# Patient Record
Sex: FEMALE | Race: OTHER | HISPANIC OR LATINO | URBAN - METROPOLITAN AREA
[De-identification: names, ages, dates, MRNs, and addresses within clinical notes are randomized per-mention and may not be internally consistent; named-entity substitution may affect disease eponyms.]

---

## 2020-08-30 ENCOUNTER — OUTPATIENT (OUTPATIENT)
Dept: OUTPATIENT SERVICES | Facility: HOSPITAL | Age: 40
LOS: 1 days | End: 2020-08-30
Payer: COMMERCIAL

## 2020-08-30 DIAGNOSIS — O26.899 OTHER SPECIFIED PREGNANCY RELATED CONDITIONS, UNSPECIFIED TRIMESTER: ICD-10-CM

## 2020-08-30 DIAGNOSIS — Z3A.00 WEEKS OF GESTATION OF PREGNANCY NOT SPECIFIED: ICD-10-CM

## 2020-08-30 PROCEDURE — 76818 FETAL BIOPHYS PROFILE W/NST: CPT

## 2020-08-30 PROCEDURE — 76818 FETAL BIOPHYS PROFILE W/NST: CPT | Mod: 26

## 2020-08-30 PROCEDURE — 76815 OB US LIMITED FETUS(S): CPT

## 2020-08-30 PROCEDURE — 59025 FETAL NON-STRESS TEST: CPT

## 2020-08-30 PROCEDURE — 76815 OB US LIMITED FETUS(S): CPT | Mod: 26

## 2020-08-30 PROCEDURE — G0463: CPT

## 2020-09-01 ENCOUNTER — INPATIENT (INPATIENT)
Facility: HOSPITAL | Age: 40
LOS: 1 days | Discharge: HOME | End: 2020-09-03
Attending: OBSTETRICS & GYNECOLOGY | Admitting: OBSTETRICS & GYNECOLOGY
Payer: COMMERCIAL

## 2020-09-01 VITALS — HEIGHT: 63 IN | WEIGHT: 178.57 LBS

## 2020-09-01 DIAGNOSIS — Z34.80 ENCOUNTER FOR SUPERVISION OF OTHER NORMAL PREGNANCY, UNSPECIFIED TRIMESTER: ICD-10-CM

## 2020-09-01 DIAGNOSIS — Z3A.00 WEEKS OF GESTATION OF PREGNANCY NOT SPECIFIED: ICD-10-CM

## 2020-09-01 DIAGNOSIS — O26.899 OTHER SPECIFIED PREGNANCY RELATED CONDITIONS, UNSPECIFIED TRIMESTER: ICD-10-CM

## 2020-09-01 LAB
ABO RH CONFIRMATION: SIGNIFICANT CHANGE UP
APTT BLD: 26.2 SEC — LOW (ref 27.5–35.5)
BASOPHILS # BLD AUTO: 0.02 K/UL — SIGNIFICANT CHANGE UP (ref 0–0.2)
BASOPHILS # BLD AUTO: 0.02 K/UL — SIGNIFICANT CHANGE UP (ref 0–0.2)
BASOPHILS NFR BLD AUTO: 0.1 % — SIGNIFICANT CHANGE UP (ref 0–2)
BASOPHILS NFR BLD AUTO: 0.2 % — SIGNIFICANT CHANGE UP (ref 0–2)
EOSINOPHIL # BLD AUTO: 0 K/UL — SIGNIFICANT CHANGE UP (ref 0–0.5)
EOSINOPHIL # BLD AUTO: 0.02 K/UL — SIGNIFICANT CHANGE UP (ref 0–0.5)
EOSINOPHIL NFR BLD AUTO: 0 % — SIGNIFICANT CHANGE UP (ref 0–6)
EOSINOPHIL NFR BLD AUTO: 0.2 % — SIGNIFICANT CHANGE UP (ref 0–6)
FIBRINOGEN PPP-MCNC: 699 MG/DL — HIGH (ref 350–510)
HCT VFR BLD CALC: 32.6 % — LOW (ref 34.5–45)
HCT VFR BLD CALC: 36.4 % — SIGNIFICANT CHANGE UP (ref 34.5–45)
HGB BLD-MCNC: 10.7 G/DL — LOW (ref 11.5–15.5)
HGB BLD-MCNC: 12 G/DL — SIGNIFICANT CHANGE UP (ref 11.5–15.5)
IMM GRANULOCYTES NFR BLD AUTO: 0.4 % — SIGNIFICANT CHANGE UP (ref 0–1.5)
IMM GRANULOCYTES NFR BLD AUTO: 0.6 % — SIGNIFICANT CHANGE UP (ref 0–1.5)
INR BLD: 0.89 RATIO — SIGNIFICANT CHANGE UP (ref 0.88–1.16)
LYMPHOCYTES # BLD AUTO: 1.32 K/UL — SIGNIFICANT CHANGE UP (ref 1–3.3)
LYMPHOCYTES # BLD AUTO: 17.4 % — SIGNIFICANT CHANGE UP (ref 13–44)
LYMPHOCYTES # BLD AUTO: 2 K/UL — SIGNIFICANT CHANGE UP (ref 1–3.3)
LYMPHOCYTES # BLD AUTO: 8.4 % — LOW (ref 13–44)
MCHC RBC-ENTMCNC: 28.6 PG — SIGNIFICANT CHANGE UP (ref 27–34)
MCHC RBC-ENTMCNC: 28.8 PG — SIGNIFICANT CHANGE UP (ref 27–34)
MCHC RBC-ENTMCNC: 32.8 GM/DL — SIGNIFICANT CHANGE UP (ref 32–36)
MCHC RBC-ENTMCNC: 33 GM/DL — SIGNIFICANT CHANGE UP (ref 32–36)
MCV RBC AUTO: 87.2 FL — SIGNIFICANT CHANGE UP (ref 80–100)
MCV RBC AUTO: 87.3 FL — SIGNIFICANT CHANGE UP (ref 80–100)
MONOCYTES # BLD AUTO: 0.68 K/UL — SIGNIFICANT CHANGE UP (ref 0–0.9)
MONOCYTES # BLD AUTO: 0.73 K/UL — SIGNIFICANT CHANGE UP (ref 0–0.9)
MONOCYTES NFR BLD AUTO: 4.7 % — SIGNIFICANT CHANGE UP (ref 2–14)
MONOCYTES NFR BLD AUTO: 5.9 % — SIGNIFICANT CHANGE UP (ref 2–14)
NEUTROPHILS # BLD AUTO: 13.49 K/UL — HIGH (ref 1.8–7.4)
NEUTROPHILS # BLD AUTO: 8.73 K/UL — HIGH (ref 1.8–7.4)
NEUTROPHILS NFR BLD AUTO: 75.9 % — SIGNIFICANT CHANGE UP (ref 43–77)
NEUTROPHILS NFR BLD AUTO: 86.2 % — HIGH (ref 43–77)
NRBC # BLD: 0 /100 WBCS — SIGNIFICANT CHANGE UP (ref 0–0)
NRBC # BLD: 0 /100 WBCS — SIGNIFICANT CHANGE UP (ref 0–0)
PLATELET # BLD AUTO: 234 K/UL — SIGNIFICANT CHANGE UP (ref 150–400)
PLATELET # BLD AUTO: 272 K/UL — SIGNIFICANT CHANGE UP (ref 150–400)
PROTHROM AB SERPL-ACNC: 10.5 SEC — LOW (ref 10.6–13.6)
RBC # BLD: 3.74 M/UL — LOW (ref 3.8–5.2)
RBC # BLD: 4.17 M/UL — SIGNIFICANT CHANGE UP (ref 3.8–5.2)
RBC # FLD: 14.5 % — SIGNIFICANT CHANGE UP (ref 10.3–14.5)
RBC # FLD: 14.8 % — HIGH (ref 10.3–14.5)
SARS-COV-2 IGG SERPL QL IA: NEGATIVE — SIGNIFICANT CHANGE UP
SARS-COV-2 IGM SERPL IA-ACNC: 8.88 AU/ML — SIGNIFICANT CHANGE UP
SARS-COV-2 RNA SPEC QL NAA+PROBE: SIGNIFICANT CHANGE UP
T PALLIDUM AB TITR SER: NEGATIVE — SIGNIFICANT CHANGE UP
WBC # BLD: 11.5 K/UL — HIGH (ref 3.8–10.5)
WBC # BLD: 15.65 K/UL — HIGH (ref 3.8–10.5)
WBC # FLD AUTO: 11.5 K/UL — HIGH (ref 3.8–10.5)
WBC # FLD AUTO: 15.65 K/UL — HIGH (ref 3.8–10.5)

## 2020-09-01 RX ORDER — OXYCODONE HYDROCHLORIDE 5 MG/1
5 TABLET ORAL
Refills: 0 | Status: DISCONTINUED | OUTPATIENT
Start: 2020-09-01 | End: 2020-09-03

## 2020-09-01 RX ORDER — LANOLIN
1 OINTMENT (GRAM) TOPICAL EVERY 6 HOURS
Refills: 0 | Status: DISCONTINUED | OUTPATIENT
Start: 2020-09-01 | End: 2020-09-03

## 2020-09-01 RX ORDER — SIMETHICONE 80 MG/1
80 TABLET, CHEWABLE ORAL EVERY 4 HOURS
Refills: 0 | Status: DISCONTINUED | OUTPATIENT
Start: 2020-09-01 | End: 2020-09-03

## 2020-09-01 RX ORDER — AZITHROMYCIN 500 MG/1
500 TABLET, FILM COATED ORAL ONCE
Refills: 0 | Status: COMPLETED | OUTPATIENT
Start: 2020-09-01 | End: 2020-09-01

## 2020-09-01 RX ORDER — SODIUM CHLORIDE 9 MG/ML
1000 INJECTION, SOLUTION INTRAVENOUS
Refills: 0 | Status: DISCONTINUED | OUTPATIENT
Start: 2020-09-01 | End: 2020-09-01

## 2020-09-01 RX ORDER — FOLIC ACID 0.8 MG
1 TABLET ORAL DAILY
Refills: 0 | Status: DISCONTINUED | OUTPATIENT
Start: 2020-09-01 | End: 2020-09-03

## 2020-09-01 RX ORDER — TETANUS TOXOID, REDUCED DIPHTHERIA TOXOID AND ACELLULAR PERTUSSIS VACCINE, ADSORBED 5; 2.5; 8; 8; 2.5 [IU]/.5ML; [IU]/.5ML; UG/.5ML; UG/.5ML; UG/.5ML
0.5 SUSPENSION INTRAMUSCULAR ONCE
Refills: 0 | Status: DISCONTINUED | OUTPATIENT
Start: 2020-09-01 | End: 2020-09-03

## 2020-09-01 RX ORDER — IBUPROFEN 200 MG
600 TABLET ORAL EVERY 6 HOURS
Refills: 0 | Status: COMPLETED | OUTPATIENT
Start: 2020-09-01 | End: 2021-07-31

## 2020-09-01 RX ORDER — FERROUS SULFATE 325(65) MG
325 TABLET ORAL DAILY
Refills: 0 | Status: DISCONTINUED | OUTPATIENT
Start: 2020-09-01 | End: 2020-09-03

## 2020-09-01 RX ORDER — OXYTOCIN 10 UNIT/ML
333.33 VIAL (ML) INJECTION
Qty: 20 | Refills: 0 | Status: DISCONTINUED | OUTPATIENT
Start: 2020-09-01 | End: 2020-09-03

## 2020-09-01 RX ORDER — HEPARIN SODIUM 5000 [USP'U]/ML
5000 INJECTION INTRAVENOUS; SUBCUTANEOUS EVERY 12 HOURS
Refills: 0 | Status: DISCONTINUED | OUTPATIENT
Start: 2020-09-01 | End: 2020-09-03

## 2020-09-01 RX ORDER — MAGNESIUM HYDROXIDE 400 MG/1
30 TABLET, CHEWABLE ORAL
Refills: 0 | Status: DISCONTINUED | OUTPATIENT
Start: 2020-09-01 | End: 2020-09-03

## 2020-09-01 RX ORDER — ACETAMINOPHEN 500 MG
975 TABLET ORAL
Refills: 0 | Status: DISCONTINUED | OUTPATIENT
Start: 2020-09-01 | End: 2020-09-03

## 2020-09-01 RX ORDER — SODIUM CHLORIDE 9 MG/ML
1000 INJECTION, SOLUTION INTRAVENOUS
Refills: 0 | Status: DISCONTINUED | OUTPATIENT
Start: 2020-09-01 | End: 2020-09-03

## 2020-09-01 RX ORDER — DIPHENHYDRAMINE HCL 50 MG
25 CAPSULE ORAL EVERY 6 HOURS
Refills: 0 | Status: DISCONTINUED | OUTPATIENT
Start: 2020-09-01 | End: 2020-09-03

## 2020-09-01 RX ORDER — KETOROLAC TROMETHAMINE 30 MG/ML
30 SYRINGE (ML) INJECTION EVERY 6 HOURS
Refills: 0 | Status: DISCONTINUED | OUTPATIENT
Start: 2020-09-01 | End: 2020-09-02

## 2020-09-01 RX ORDER — CITRIC ACID/SODIUM CITRATE 300-500 MG
15 SOLUTION, ORAL ORAL EVERY 6 HOURS
Refills: 0 | Status: DISCONTINUED | OUTPATIENT
Start: 2020-09-01 | End: 2020-09-01

## 2020-09-01 RX ORDER — CEFAZOLIN SODIUM 1 G
2000 VIAL (EA) INJECTION ONCE
Refills: 0 | Status: COMPLETED | OUTPATIENT
Start: 2020-09-01 | End: 2020-09-01

## 2020-09-01 RX ORDER — CITRIC ACID/SODIUM CITRATE 300-500 MG
30 SOLUTION, ORAL ORAL ONCE
Refills: 0 | Status: COMPLETED | OUTPATIENT
Start: 2020-09-01 | End: 2020-09-01

## 2020-09-01 RX ADMIN — SIMETHICONE 80 MILLIGRAM(S): 80 TABLET, CHEWABLE ORAL at 21:35

## 2020-09-01 RX ADMIN — SODIUM CHLORIDE 125 MILLILITER(S): 9 INJECTION, SOLUTION INTRAVENOUS at 02:50

## 2020-09-01 RX ADMIN — Medication 975 MILLIGRAM(S): at 22:10

## 2020-09-01 RX ADMIN — HEPARIN SODIUM 5000 UNIT(S): 5000 INJECTION INTRAVENOUS; SUBCUTANEOUS at 21:35

## 2020-09-01 RX ADMIN — MAGNESIUM HYDROXIDE 30 MILLILITER(S): 400 TABLET, CHEWABLE ORAL at 18:26

## 2020-09-01 RX ADMIN — SIMETHICONE 80 MILLIGRAM(S): 80 TABLET, CHEWABLE ORAL at 18:08

## 2020-09-01 RX ADMIN — AZITHROMYCIN 255 MILLIGRAM(S): 500 TABLET, FILM COATED ORAL at 07:49

## 2020-09-01 RX ADMIN — Medication 30 MILLIGRAM(S): at 19:00

## 2020-09-01 RX ADMIN — Medication 100 MILLIGRAM(S): at 07:47

## 2020-09-01 RX ADMIN — Medication 1000 MILLIUNIT(S)/MIN: at 10:54

## 2020-09-01 RX ADMIN — Medication 975 MILLIGRAM(S): at 21:36

## 2020-09-01 RX ADMIN — Medication 30 MILLIGRAM(S): at 23:52

## 2020-09-01 RX ADMIN — Medication 30 MILLIGRAM(S): at 18:26

## 2020-09-01 NOTE — PATIENT PROFILE OB - FALL HARM RISK CONCLUSION
Patient placed on cardiac monitor.       Mariam Rene RN  08/04/20 3896 [employer]ventions

## 2020-09-01 NOTE — CHART NOTE - NSCHARTNOTEFT_GEN_A_CORE
Pt seen at bedside offers no complaints. Denies n/v, CP; SOB; palpitations; or dizziness    T(C): 36.7 (09-01-20 @ 10:48), Max: 36.7 (09-01-20 @ 00:53)  HR: 83 (09-01-20 @ 13:00) (78 - 95)  BP: 109/59 (09-01-20 @ 13:00) (105/64 - 131/80)  RR: 18 (09-01-20 @ 13:00) (11 - 18)  SpO2: 99% (09-01-20 @ 13:00) (97% - 100%)    abd: soft/nt, fundus firm, dressing in place C/D/I  pelvic: minimal lochia  ext: venodynes in place; no calf pain    A/P: POD #0 s/p primary c/s at 40.3 wks   cont close monitor   cont post op care  d/w Dr. Currie

## 2020-09-02 LAB
BASOPHILS # BLD AUTO: 0.03 K/UL — SIGNIFICANT CHANGE UP (ref 0–0.2)
BASOPHILS NFR BLD AUTO: 0.2 % — SIGNIFICANT CHANGE UP (ref 0–2)
EOSINOPHIL # BLD AUTO: 0.03 K/UL — SIGNIFICANT CHANGE UP (ref 0–0.5)
EOSINOPHIL NFR BLD AUTO: 0.2 % — SIGNIFICANT CHANGE UP (ref 0–6)
HCT VFR BLD CALC: 31 % — LOW (ref 34.5–45)
HGB BLD-MCNC: 10 G/DL — LOW (ref 11.5–15.5)
IMM GRANULOCYTES NFR BLD AUTO: 0.9 % — SIGNIFICANT CHANGE UP (ref 0–1.5)
LYMPHOCYTES # BLD AUTO: 19.5 % — SIGNIFICANT CHANGE UP (ref 13–44)
LYMPHOCYTES # BLD AUTO: 2.39 K/UL — SIGNIFICANT CHANGE UP (ref 1–3.3)
MCHC RBC-ENTMCNC: 28.6 PG — SIGNIFICANT CHANGE UP (ref 27–34)
MCHC RBC-ENTMCNC: 32.3 GM/DL — SIGNIFICANT CHANGE UP (ref 32–36)
MCV RBC AUTO: 88.6 FL — SIGNIFICANT CHANGE UP (ref 80–100)
MONOCYTES # BLD AUTO: 0.91 K/UL — HIGH (ref 0–0.9)
MONOCYTES NFR BLD AUTO: 7.4 % — SIGNIFICANT CHANGE UP (ref 2–14)
NEUTROPHILS # BLD AUTO: 8.8 K/UL — HIGH (ref 1.8–7.4)
NEUTROPHILS NFR BLD AUTO: 71.8 % — SIGNIFICANT CHANGE UP (ref 43–77)
NRBC # BLD: 0 /100 WBCS — SIGNIFICANT CHANGE UP (ref 0–0)
PLATELET # BLD AUTO: 222 K/UL — SIGNIFICANT CHANGE UP (ref 150–400)
RBC # BLD: 3.5 M/UL — LOW (ref 3.8–5.2)
RBC # FLD: 14.8 % — HIGH (ref 10.3–14.5)
WBC # BLD: 12.27 K/UL — HIGH (ref 3.8–10.5)
WBC # FLD AUTO: 12.27 K/UL — HIGH (ref 3.8–10.5)

## 2020-09-02 RX ORDER — IBUPROFEN 200 MG
600 TABLET ORAL EVERY 6 HOURS
Refills: 0 | Status: DISCONTINUED | OUTPATIENT
Start: 2020-09-02 | End: 2020-09-03

## 2020-09-02 RX ADMIN — Medication 1 MILLIGRAM(S): at 12:22

## 2020-09-02 RX ADMIN — Medication 600 MILLIGRAM(S): at 17:45

## 2020-09-02 RX ADMIN — SIMETHICONE 80 MILLIGRAM(S): 80 TABLET, CHEWABLE ORAL at 05:56

## 2020-09-02 RX ADMIN — SIMETHICONE 80 MILLIGRAM(S): 80 TABLET, CHEWABLE ORAL at 21:03

## 2020-09-02 RX ADMIN — Medication 975 MILLIGRAM(S): at 03:26

## 2020-09-02 RX ADMIN — Medication 975 MILLIGRAM(S): at 04:00

## 2020-09-02 RX ADMIN — Medication 30 MILLIGRAM(S): at 12:00

## 2020-09-02 RX ADMIN — SIMETHICONE 80 MILLIGRAM(S): 80 TABLET, CHEWABLE ORAL at 12:22

## 2020-09-02 RX ADMIN — Medication 1 TABLET(S): at 12:22

## 2020-09-02 RX ADMIN — Medication 975 MILLIGRAM(S): at 21:02

## 2020-09-02 RX ADMIN — Medication 600 MILLIGRAM(S): at 17:15

## 2020-09-02 RX ADMIN — Medication 975 MILLIGRAM(S): at 21:38

## 2020-09-02 RX ADMIN — SIMETHICONE 80 MILLIGRAM(S): 80 TABLET, CHEWABLE ORAL at 17:15

## 2020-09-02 RX ADMIN — MAGNESIUM HYDROXIDE 30 MILLILITER(S): 400 TABLET, CHEWABLE ORAL at 05:56

## 2020-09-02 RX ADMIN — Medication 30 MILLIGRAM(S): at 12:30

## 2020-09-02 RX ADMIN — SIMETHICONE 80 MILLIGRAM(S): 80 TABLET, CHEWABLE ORAL at 03:26

## 2020-09-02 RX ADMIN — Medication 325 MILLIGRAM(S): at 12:22

## 2020-09-02 RX ADMIN — MAGNESIUM HYDROXIDE 30 MILLILITER(S): 400 TABLET, CHEWABLE ORAL at 17:16

## 2020-09-02 RX ADMIN — Medication 30 MILLIGRAM(S): at 06:29

## 2020-09-02 RX ADMIN — Medication 30 MILLIGRAM(S): at 00:20

## 2020-09-02 RX ADMIN — Medication 30 MILLIGRAM(S): at 05:56

## 2020-09-02 RX ADMIN — HEPARIN SODIUM 5000 UNIT(S): 5000 INJECTION INTRAVENOUS; SUBCUTANEOUS at 21:35

## 2020-09-02 RX ADMIN — HEPARIN SODIUM 5000 UNIT(S): 5000 INJECTION INTRAVENOUS; SUBCUTANEOUS at 10:00

## 2020-09-02 NOTE — PROGRESS NOTE ADULT - ASSESSMENT
A/P:  39y POD # 1 s/p primary  @40w3d, for cat 2 traing remote from delivery, currently in stable condition  - Heparin for dvt prophylaxis  - OOB  - incentive spirometry  - dressing removed  - advance diet with flatus  - continue pain mgmt  - continue post op care  -cbc in am    DR Rosey hagen

## 2020-09-03 VITALS
TEMPERATURE: 98 F | SYSTOLIC BLOOD PRESSURE: 110 MMHG | OXYGEN SATURATION: 98 % | DIASTOLIC BLOOD PRESSURE: 62 MMHG | HEART RATE: 67 BPM | RESPIRATION RATE: 18 BRPM

## 2020-09-03 LAB
BASOPHILS # BLD AUTO: 0.02 K/UL — SIGNIFICANT CHANGE UP (ref 0–0.2)
BASOPHILS NFR BLD AUTO: 0.2 % — SIGNIFICANT CHANGE UP (ref 0–2)
EOSINOPHIL # BLD AUTO: 0.03 K/UL — SIGNIFICANT CHANGE UP (ref 0–0.5)
EOSINOPHIL NFR BLD AUTO: 0.3 % — SIGNIFICANT CHANGE UP (ref 0–6)
HCT VFR BLD CALC: 30.9 % — LOW (ref 34.5–45)
HGB BLD-MCNC: 9.8 G/DL — LOW (ref 11.5–15.5)
IMM GRANULOCYTES NFR BLD AUTO: 0.8 % — SIGNIFICANT CHANGE UP (ref 0–1.5)
LYMPHOCYTES # BLD AUTO: 2.27 K/UL — SIGNIFICANT CHANGE UP (ref 1–3.3)
LYMPHOCYTES # BLD AUTO: 25.6 % — SIGNIFICANT CHANGE UP (ref 13–44)
MCHC RBC-ENTMCNC: 28.6 PG — SIGNIFICANT CHANGE UP (ref 27–34)
MCHC RBC-ENTMCNC: 31.7 GM/DL — LOW (ref 32–36)
MCV RBC AUTO: 90.1 FL — SIGNIFICANT CHANGE UP (ref 80–100)
MONOCYTES # BLD AUTO: 0.59 K/UL — SIGNIFICANT CHANGE UP (ref 0–0.9)
MONOCYTES NFR BLD AUTO: 6.6 % — SIGNIFICANT CHANGE UP (ref 2–14)
NEUTROPHILS # BLD AUTO: 5.9 K/UL — SIGNIFICANT CHANGE UP (ref 1.8–7.4)
NEUTROPHILS NFR BLD AUTO: 66.5 % — SIGNIFICANT CHANGE UP (ref 43–77)
NRBC # BLD: 0 /100 WBCS — SIGNIFICANT CHANGE UP (ref 0–0)
PLATELET # BLD AUTO: 243 K/UL — SIGNIFICANT CHANGE UP (ref 150–400)
RBC # BLD: 3.43 M/UL — LOW (ref 3.8–5.2)
RBC # FLD: 15.2 % — HIGH (ref 10.3–14.5)
WBC # BLD: 8.88 K/UL — SIGNIFICANT CHANGE UP (ref 3.8–10.5)
WBC # FLD AUTO: 8.88 K/UL — SIGNIFICANT CHANGE UP (ref 3.8–10.5)

## 2020-09-03 PROCEDURE — 36415 COLL VENOUS BLD VENIPUNCTURE: CPT

## 2020-09-03 PROCEDURE — 86850 RBC ANTIBODY SCREEN: CPT

## 2020-09-03 PROCEDURE — 85027 COMPLETE CBC AUTOMATED: CPT

## 2020-09-03 PROCEDURE — 85384 FIBRINOGEN ACTIVITY: CPT

## 2020-09-03 PROCEDURE — 86780 TREPONEMA PALLIDUM: CPT

## 2020-09-03 PROCEDURE — G0463: CPT

## 2020-09-03 PROCEDURE — 59025 FETAL NON-STRESS TEST: CPT

## 2020-09-03 PROCEDURE — 85730 THROMBOPLASTIN TIME PARTIAL: CPT

## 2020-09-03 PROCEDURE — 86923 COMPATIBILITY TEST ELECTRIC: CPT

## 2020-09-03 PROCEDURE — 86901 BLOOD TYPING SEROLOGIC RH(D): CPT

## 2020-09-03 PROCEDURE — 86900 BLOOD TYPING SEROLOGIC ABO: CPT

## 2020-09-03 PROCEDURE — 59050 FETAL MONITOR W/REPORT: CPT

## 2020-09-03 PROCEDURE — 86769 SARS-COV-2 COVID-19 ANTIBODY: CPT

## 2020-09-03 PROCEDURE — 85610 PROTHROMBIN TIME: CPT

## 2020-09-03 PROCEDURE — 87635 SARS-COV-2 COVID-19 AMP PRB: CPT

## 2020-09-03 RX ORDER — INFLUENZA VIRUS VACCINE 15; 15; 15; 15 UG/.5ML; UG/.5ML; UG/.5ML; UG/.5ML
0.5 SUSPENSION INTRAMUSCULAR ONCE
Refills: 0 | Status: DISCONTINUED | OUTPATIENT
Start: 2020-09-03 | End: 2020-09-03

## 2020-09-03 RX ORDER — FERROUS SULFATE 325(65) MG
1 TABLET ORAL
Qty: 30 | Refills: 0
Start: 2020-09-03 | End: 2020-10-02

## 2020-09-03 RX ORDER — DOCUSATE SODIUM 100 MG
1 CAPSULE ORAL
Qty: 60 | Refills: 0
Start: 2020-09-03 | End: 2020-10-02

## 2020-09-03 RX ORDER — IBUPROFEN 200 MG
1 TABLET ORAL
Qty: 12 | Refills: 0
Start: 2020-09-03 | End: 2020-09-05

## 2020-09-03 RX ADMIN — Medication 975 MILLIGRAM(S): at 02:50

## 2020-09-03 RX ADMIN — SIMETHICONE 80 MILLIGRAM(S): 80 TABLET, CHEWABLE ORAL at 08:15

## 2020-09-03 RX ADMIN — SIMETHICONE 80 MILLIGRAM(S): 80 TABLET, CHEWABLE ORAL at 06:09

## 2020-09-03 RX ADMIN — Medication 975 MILLIGRAM(S): at 09:00

## 2020-09-03 RX ADMIN — Medication 975 MILLIGRAM(S): at 08:14

## 2020-09-03 RX ADMIN — Medication 600 MILLIGRAM(S): at 06:45

## 2020-09-03 RX ADMIN — Medication 600 MILLIGRAM(S): at 06:10

## 2020-09-03 RX ADMIN — Medication 975 MILLIGRAM(S): at 03:30

## 2020-09-03 RX ADMIN — Medication 600 MILLIGRAM(S): at 00:41

## 2020-09-03 RX ADMIN — Medication 600 MILLIGRAM(S): at 00:08

## 2020-09-03 NOTE — PROGRESS NOTE ADULT - PROBLEM SELECTOR PLAN 1
A/P: POD #2 s/p c/s     -d/c home   -instructions verbalized  -follow up in 1-2weeks in [place]  -d/w dr.cha bunn

## 2020-09-03 NOTE — DISCHARGE NOTE OB - MATERIALS PROVIDED
Breastfeeding Mother’s Support Group Information/Guide to Postpartum Care/Back To Sleep Handout/[facility]  Immunization Record/Breastfeeding Log/[facility] Hearing Screen Program/Shaken Baby Prevention Handout/Breastfeeding Guide and Packet/Vaccinations/[facility] [place] Screening Program/Birth Certificate Instructions/Discharge Medication Information for Patients and Families Pocket Guide/Letter of Medical Neccessity

## 2020-09-03 NOTE — DISCHARGE NOTE OB - MEDICATION SUMMARY - MEDICATIONS TO TAKE
I will START or STAY ON the medications listed below when I get home from the hospital:    ibuprofen 600 mg oral tablet  -- 1 tab(s) by mouth every 6 hours  -- Indication: For pain as needed    ferrous sulfate 325 mg (65 mg elemental iron) oral tablet  -- 1 tab(s) by mouth once a day  -- Indication: For anemia    Colace 100 mg oral capsule  -- 1 cap(s) by mouth 2 times a day   -- Medication should be taken with plenty of water.    -- Indication: For Stool softner

## 2020-09-03 NOTE — DISCHARGE NOTE OB - CARE PROVIDER_API CALL
Olivier Currie  OBSTETRICS AND GYNECOLOGY  [id number] [place] [id number]  Phone: [phone]  Fax: [phone]  Follow Up Time: 2 weeks

## 2020-09-03 NOTE — DISCHARGE NOTE OB - PATIENT PORTAL LINK FT
You can access the FollowMyHealth Patient Portal offered by [facility] by registering at the following website: [email or web address]. By joining [employer]’s FollowMyHealth portal, you will also be able to view your health information using other applications (apps) compatible with our system.

## 2020-09-03 NOTE — PROGRESS NOTE ADULT - SUBJECTIVE AND OBJECTIVE BOX
POD 1  Doing well  Ambulating  Mild lochia  Pos flatus  No n/v  No SOB    vss afeb  fundus firm  abd:c/d/i, ND  ext:NT    hg 10 this AM    a/p:stable. ambulate. inc spirometer. reg diet. f/u 2 wks for post op check
OBGYN PA NOTE POD1   Patient seen and evaluated at bedside,  resting comfortably w/o any acute  complaints.  Denies fever, HA, CP, SOB, N/V/D, dizziness, palpitations, worsening abdominal pain, worsening vaginal bleeding, or any other concerns. voiding w/o difficulty.  Denies flatus and tolerating clear diet.   at bedside. Patient is attempting to breastfeed.     Vital Signs Last 24 Hrs  T(C): 37.1 (02 Sep 2020 10:05), Max: 37.1 (01 Sep 2020 22:45)  T(F): 98.8 (02 Sep 2020 10:05), Max: 98.8 (02 Sep 2020 10:05)  HR: 94 (02 Sep 2020 10:05) (65 - 94)  BP: 100/65 (02 Sep 2020 10:05) (94/56 - 110/62)  BP(mean): --  RR: 18 (02 Sep 2020 10:05) (16 - 18)  SpO2: 97% (02 Sep 2020 10:05) (97% - 99%)    Physical Exam:     Gen: A&Ox 3, NAD  Chest: CTAB/L  Cardiac: S1+S2+ RRR  Breast: Soft, nontender, nonengorged  Abdomen: Soft, nontender, Fundus firm below umbilicus, +BS. dressing clean and dry  Gyn: Mild lochia,   Extremities: Nontender, DTRS 2+, no worsening edema                          10.0   12.27 )-----------( 222      ( 02 Sep 2020 06:36 )             31.0
Patient seen at bedisde resting comfortably offers no new complaints. + Ambulation, + void without difficulty, + flatus; +bm;  tolerating regular diet. both breastfeeding and bottle feeding. Denies HA, CP, SOB, N/V/D,  dizziness, palpitations, worsening abdominal pain, worsening vaginal bleeding, or any other concerns.     Vital Signs Last 24 Hrs  T(C): 36.7 (03 Sep 2020 05:31), Max: 37.1 (02 Sep 2020 10:05)  T(F): 98 (03 Sep 2020 05:31), Max: 98.8 (02 Sep 2020 10:05)  HR: 67 (03 Sep 2020 05:31) (67 - 94)  BP: 110/62 (03 Sep 2020 05:31) (100/65 - 110/62)  BP(mean): --  RR: 18 (03 Sep 2020 05:31) (16 - 18)  SpO2: 98% (03 Sep 2020 05:31) (97% - 98%)    Gen: A&O x 3, NAD  Chest: CTA B/L  Cardiac: S1,S2  RRR  Breast: Soft, nontender, nonengorged  Abdomen: +BS; soft; Nontender, nondistended, dressing removed Incision C/D/I steri strips in place   Gyn: Minimal lochia  Extremities: Nontender, DTRS 2+, no worsening edema                          9.8    8.88  )-----------( 243      ( 03 Sep 2020 06:32 )             30.9       A/P: POD #2 s/p c/s     -d/c home   -instructions verbalized  -follow up in 1-2weeks in [place]  -d/w dr.cha bunn

## 2022-06-30 NOTE — PATIENT PROFILE OB - NS_PRENATALLABSOURCEGBS36PN_OBGYN_ALL_OB
[facility] Hospitalist H&P    Chief Complaint   Patient presents with   • Vaginal Bleeding       History Of Present Illness  34 year old female who presented to the ED with concerns of abdominal pain. Her symptoms started 2 days ago. Pain is diffuesly located in the abdomen, more in the lower region bilaterally. She reports associated nausea and vomiting. Her last BM was 2 days ago. She also reports vomiting bright red blood as well as vaginal bleeding. She states that this is not her usual time of period. She has history of irregular menses. She reports heavy alcohol drinking and history of alcohol withdrawals in the past. Denies melena/hematochezia. Denies dysuria/urgency/frequency. Denies fevers or chills.    Review of Systems  Constitutional: Denies fevers/chills,  Denies wt changes, Denies night sweats, Denies appetite changes  HEENT: Denies headache, Denies lacrimation, eye pain/itching, nasal congestion, rhinorrhea, ear pain/discharge, sore throat, neck pain/stiffness  Respiratory: Denies coughs/SOB/SINGH , Denies chest pain  Cardiovascular: Denies chest pain/palpitations, Denies leg swelling, Denies paroxysmal nocturnal dyspnea/orthopnea  Gastrointestinal: per HPI  Genitourinary: per HPI  Endocrine: Denies polyphagia/polydipsia/polyuria, Denies cold/hot intolerance, Denies hot flashes, Denies goiter  Heme: Denies easy bleeding, easy bruising, LN enlargement/pain  Musculoskeletal: Denies joint or muscle pain/swelling  Neurologic: Denies numbness/weakness, Denies vision changes, hearing loss, tinnitus, Denies headache, Denies memory loss, Denies lightheartedness/dizziness  Skin: Denies rash, itching, jaundice    Past Medical History  Past Medical History:   Diagnosis Date   • RAD (reactive airway disease)         Surgical History  Past Surgical History:   Procedure Laterality Date   • Ectopic pregnancy surgery          Social History  Social History     Tobacco Use   • Smoking status: Current Every Day Smoker   •  Smokeless tobacco: Never Used   Vaping Use   • Vaping Use: never used   Substance Use Topics   • Alcohol use: Yes   • Drug use: Never       Family History  Patient denies any history of cancer in family members.  Denies any autoimmune disease like Crohn's disease or ulcerative colitis.  She denies any family members with stroke, high blood pressure, diabetes.    Allergies  ALLERGIES:  Patient has no known allergies.    Medications  Prior to Admission medications    Not on File         Last Recorded Vitals  Blood pressure 125/85, pulse 80, temperature 97.9 °F (36.6 °C), temperature source Oral, resp. rate 18, height 5' 6\" (1.676 m), weight 52.2 kg (115 lb), last menstrual period 09/06/2021, SpO2 99 %.    Gen: In moderate distress due to pain and discomfort due to NG tube, Non-obese,  HEENT: anicteric, mmm, No conjunctival pallor/injection,  Heme: No lymphadenopathy, no bruises, no bleeding, no pallor  C/V: No elevated jugular venous pressure, No carotid bruits, S1, S2, No m/r/g, No LE edema  Lungs: Clear to auscultation b/l  Abd: +BS, mildly distended, soft, diffusely tender, no rebound or guarding, No organomegaly  : No CVAT, No suprapubic tenderness  Ext: No joint erythema/swelling/effusion  Skin: No rash, no jaundice  Neuro:  AAO x3, CN grossly intact, strength and sensations normal        Labs     Recent Results (from the past 24 hour(s))   COVID/Flu/RSV panel    Collection Time: 06/29/22  8:36 PM   Result Value Ref Range    Rapid SARS-COV-2 by PCR Not Detected Not Detected / Detected / Presumptive Positive / Inhibitors present    Influenza A by PCR Not Detected Not Detected    Influenza B by PCR Not Detected Not Detected    RSV BY PCR Not Detected Not Detected    Isolation Guidelines      Procedural Comment     Hemoglobin    Collection Time: 06/30/22  1:43 AM   Result Value Ref Range    HGB 8.7 (L) 12.0 - 15.5 g/dL   Lipase    Collection Time: 06/30/22  8:17 AM   Result Value Ref Range    Lipase 59 (L) 73 -  393 Units/L   Procalcitonin    Collection Time: 06/30/22  8:17 AM   Result Value Ref Range    Procalcitonin 0.05 <=0.09 ng/mL   Blood Culture    Collection Time: 06/30/22  8:17 AM    Specimen: Blood   Result Value Ref Range    Culture, Blood or Bone Marrow No Growth <24 hours    Lactic Acid Venous With Reflex    Collection Time: 06/30/22  8:17 AM   Result Value Ref Range    Lactate, Venous 0.5 0.0 - 2.0 mmol/L   Phosphorus    Collection Time: 06/30/22  8:17 AM   Result Value Ref Range    Phosphorus 3.7 2.4 - 4.7 mg/dL   Comprehensive Metabolic Panel    Collection Time: 06/30/22  8:17 AM   Result Value Ref Range    Fasting Status      Sodium 138 135 - 145 mmol/L    Potassium 2.9 (L) 3.4 - 5.1 mmol/L    Chloride 98 97 - 110 mmol/L    Carbon Dioxide 28 21 - 32 mmol/L    Anion Gap 15 7 - 19 mmol/L    Glucose 75 70 - 99 mg/dL    BUN 5 (L) 6 - 20 mg/dL    Creatinine 0.68 0.51 - 0.95 mg/dL    Glomerular Filtration Rate >90 >=60    BUN/ Creatinine Ratio 7 7 - 25    Calcium 8.9 8.4 - 10.2 mg/dL    Bilirubin, Total 0.3 0.2 - 1.0 mg/dL    GOT/AST 15 <=37 Units/L    GPT/ALT 12 <64 Units/L    Alkaline Phosphatase 55 45 - 117 Units/L    Albumin 3.1 (L) 3.6 - 5.1 g/dL    Protein, Total 8.0 6.4 - 8.2 g/dL    Globulin 4.9 (H) 2.0 - 4.0 g/dL    A/G Ratio 0.6 (L) 1.0 - 2.4   Magnesium    Collection Time: 06/30/22  8:17 AM   Result Value Ref Range    Magnesium 2.1 1.7 - 2.4 mg/dL   CBC No Differential    Collection Time: 06/30/22  8:17 AM   Result Value Ref Range    WBC 20.6 (H) 4.2 - 11.0 K/mcL    RBC 3.20 (L) 4.00 - 5.20 mil/mcL    HGB 8.7 (L) 12.0 - 15.5 g/dL    HCT 27.5 (L) 36.0 - 46.5 %    MCV 85.9 78.0 - 100.0 fl    MCH 27.2 26.0 - 34.0 pg    MCHC 31.6 (L) 32.0 - 36.5 g/dL     (H) 140 - 450 K/mcL    RDW-CV 18.1 (H) 11.0 - 15.0 %    RDW-SD 57.0 (H) 39.0 - 50.0 fL    NRBC 0 <=0 /100 WBC   Blood Culture    Collection Time: 06/30/22  8:39 AM    Specimen: Blood   Result Value Ref Range    Culture, Blood or Bone Marrow No Growth  <24 hours    Urinalysis & Reflex Microscopy With Culture If Indicated    Collection Time: 06/30/22 11:44 AM   Result Value Ref Range    COLOR, URINALYSIS Red     APPEARANCE, URINALYSIS Hazy     GLUCOSE, URINALYSIS Negative Negative mg/dL    BILIRUBIN, URINALYSIS Negative Negative    KETONES, URINALYSIS >80 (A) Negative mg/dL    SPECIFIC GRAVITY, URINALYSIS >=1.030 1.005 - 1.030    OCCULT BLOOD, URINALYSIS Large (A) Negative    PH, URINALYSIS 6.0 5.0 - 7.0    PROTEIN, URINALYSIS 30  (A) Negative mg/dL    UROBILINOGEN, URINALYSIS 0.2 0.2, 1.0 mg/dL    NITRITE, URINALYSIS Negative Negative    LEUKOCYTE ESTERASE, URINALYSIS Negative Negative    SQUAMOUS EPITHELIAL, URINALYSIS 6 to 10 (A) None Seen, 1 to 5 /hpf    ERYTHROCYTES, URINALYSIS >100 (A) None Seen, 1 to 2 /hpf    LEUKOCYTES, URINALYSIS 6 to 10 (A) None Seen, 1 to 5 /hpf    BACTERIA, URINALYSIS Few (A) None Seen /hpf    HYALINE CASTS, URINALYSIS None Seen None Seen, 1 to 5 /lpf   Alcohol    Collection Time: 06/30/22  1:07 PM   Result Value Ref Range    Alcohol None Detected None Detected mg/dL   Potassium    Collection Time: 06/30/22  2:58 PM   Result Value Ref Range    Potassium 2.8 (L) 3.4 - 5.1 mmol/L        Imaging  CT ABDOMEN PELVIS WO CONTRAST  Impression:   1.  Abnormal appearance of the sigmoid colon, rectum, and adjacent mesentery.  Infectious or inflammatory colitis suspected.  2.  Pelvic ascites, no definite hemoperitoneum as suspected and reported in the preliminary report.  3.  Ovaries prominent but have normal morphology on pelvic ultrasound.  4.  Borderline left jejunal dilatation which may represent peristalsis.  Reflex ileus or early or partial small bowel obstruction not entirely excluded, consider abdomen x-ray follow-up.   5.  Probable small uterine fibroid not detected at pelvic ultrasound.   6.  Nonspecific retroperitoneal adenopathy reactive.   7.  Caveat: Sensitivity of this study for a variety of pathology is  diminished due to the  absence of contrast material..  Signed on: 6/30/2022 7:57 AM     XR CHEST PA OR AP 1 VIEW  Impression: FINDINGS AND IMPRESSION::  Alimentary tube projecting upward in the upper esophagus.  Lungs clear, prominent right nipple shadow.  Support apparatus overlies.  Metal foreign body in projection of left upper abdomen.  Signed on: 6/30/2022 6:52 AM     XR ABDOMEN 1 VIEW  Impression: FINDINGS AND IMPRESSION::  There is a surgical needle foreign body in the subcutaneous fat of the left paramedian ventral upper abdomen; it was not present on CT from 05/10/2009.  New alimentary tube is coiled in esophagus with tip projecting upward.  A more recent abdomen x-ray has shown that this has been repositioned successfully.    Low abdomen excluded from field-of-view, visualized upper abdominal bowel gas pattern is normal.    Signed on: 6/30/2022 6:29 AM     XR CHEST PA OR AP 1 VIEW  Impression: FINDINGS with IMPRESSION:  NG tube tip in projection of stomach.    Lungs clear, heart size normal.  Metal foreign body in left upper abdomen.  Support apparatus overlies.  Signed on: 6/30/2022 6:17 AM     NM GI BLOOD LOSS  Impression:  FINDINGS AND IMPRESSION::  No abnormal activity is detected.  Nothing to explain GI bleeding.  If bleeding becomes active today, follow-up scanning could be performed today.  Signed on: 6/30/2022 6:09 AM       Imaging  CT ABDOMEN PELVIS WO CONTRAST  Narrative: EXAM: CT abdomen pelvis without contrast    CLINICAL INDICATION: Abdomen distention     COMPARISON:  No prior similar.  Comparing with the concomitant pelvic  ultrasound.    TECHNIQUE:  Oral contrast imaging through abdomen and pelvis    Did a Tele-radiologist issue a preliminary report?:  Yes    FINDINGS:   Lung bases clear.  No pneumoperitoneum.       Moderate motion unsharpness.  Metal support apparatus overlies abdomen and  pelvis.  No aggressive lytic or blastic lesion or fracture.  1 cm bone  island in the left lateral pubis near  acetabulum.    Metal foreign body in the ventral left paramedian subcutaneous fat mid to  upper abdomen at level L2 having sickle appearance.  Otherwise unremarkable  abdominal wall.  Muscles symmetric.    Perirectal fat infiltration, fat infiltration around sigmoid colon.   Cul-de-sac ascites.    No pleural or pericardial effusion.    Small to moderate lymph node enlargement in the retroperitoneum.    Heart size normal.  No aneurysm.  Reasonably symmetric caliber major pelvic  veins.    GE junction and stomach normal.  Orally ingested contrast reaches proximal  large bowel.  Apparent anastomotic surgical staples from a right  hemicolectomy.  No large bowel is seen in the right lower quadrant.    Borderline jejunal 2.5 cm caliber in the left abdomen without wall  thickening, otherwise normal caliber small bowel.  No distinct dilated  small bowel.    Much stool in the proximal large bowel and there is borderline dilatation  at about 5 cm.  Remainder of large bowel is normal caliber.  Diffuse mild  mural thickening of the sigmoid colon and rectum.       Posterior uterine intramural ovoid 2 cm peripherally hyperattenuating  lesion.  Uterus otherwise inconspicuous.  Symmetric ovarian prominence.    Low volume urinary bladder mild wall thickening.  Ureters nondistended.  No  urolithiasis.  Kidneys normal.    Spleen normal.  Adrenals normal.  Pancreas normal.  Gallbladder and bile  ducts normal.    Liver size and contour are normal.  Preliminary report of liver steatosis  is not corroborated.  Impression: 1.  Abnormal appearance of the sigmoid colon, rectum, and adjacent  mesentery.  Infectious or inflammatory colitis suspected.  2.  Pelvic ascites, no definite hemoperitoneum as suspected and reported in  the preliminary report.  3.  Ovaries prominent but have normal morphology on pelvic ultrasound.  4.  Borderline left jejunal dilatation which may represent peristalsis.   Reflex ileus or early or partial small bowel  obstruction not entirely  excluded, consider abdomen x-ray follow-up.  5.  Probable small uterine fibroid not detected at pelvic ultrasound.  6.  Nonspecific retroperitoneal adenopathy reactive.  7.  Caveat: Sensitivity of this study for a variety of pathology is  diminished due to the absence of contrast material..    Electronically Signed by: AALIYAH OZUNA M.D.   Signed on: 6/30/2022 7:57 AM   XR CHEST PA OR AP 1 VIEW  Narrative: EXAM: AP portable upright chest    INDICATION NG tube placement    A more recent x-ray has already been reported.    Impression: FINDINGS AND IMPRESSION::  Alimentary tube projecting upward in the upper esophagus.  Lungs clear,  prominent right nipple shadow.  Support apparatus overlies.  Metal foreign  body in projection of left upper abdomen.    Electronically Signed by: AALIYAH OZUNA M.D.   Signed on: 6/30/2022 6:52 AM   XR ABDOMEN 1 VIEW  Narrative: EXAM: AP portable upright abdomen    INDICATION hemoperitoneum and vaginal bleeding.       Comparing CT abdomen pelvis from 06/30/2022.  Comparing chest x-ray from  09/06/2021.  Comparing CT chest 05/10/2009 including abdomen on  view.  Impression: FINDINGS AND IMPRESSION::   There is a surgical needle foreign body in the subcutaneous fat of the  left paramedian ventral upper abdomen; it was not present on CT from  05/10/2009.     New alimentary tube is coiled in esophagus with tip projecting upward.  A  more recent abdomen x-ray has shown that this has been repositioned  successfully.       Low abdomen excluded from field-of-view, visualized upper abdominal bowel  gas pattern is normal.      Attending physician Dr Tressa Gonzalez notified of the foreign body via perfect  serve text.    Electronically Signed by: AALIYAH OZUNA M.D.   Signed on: 6/30/2022 6:29 AM   XR CHEST PA OR AP 1 VIEW  Narrative: EXAM: AP portable upright chest    INDICATION NG tube repositioning, pneumoperitoneum and vaginal bleeding     Comparing x-rays from several minutes  before.  Impression: FINDINGS with IMPRESSION:   NG tube tip in projection of stomach.      Lungs clear, heart size normal.  Metal foreign body in left upper abdomen.   Support apparatus overlies.    Electronically Signed by: AALIYAH OZUNA M.D.   Signed on: 6/30/2022 6:17 AM   NM GI BLOOD LOSS  Narrative: EXAM: Radionuclide GI bleeding study     INDICATION hematemesis and anemia     Technetium 99m labeled red cells implying, 28.1 mCi injected.  Images  obtained at various intervals from 10 minutes up to two hours.      Tele-radiologist issued preliminary.      No prior.  Impression:  FINDINGS AND IMPRESSION::   No abnormal activity is detected.    IMPRESSION:  Nothing to explain GI bleeding.  If bleeding becomes active today,  follow-up scanning could be performed today.    Electronically Signed by: AALIYAH OZUNA M.D.   Signed on: 6/30/2022 6:09 AM            Assessment/Plan  34 year old female who presented to the ED with concerns of abdominal pain.    Acute colitis  Ascites  CT of abdomen and pelvis without contrast upon admission showed abnormal appearance of sigmoid colon, rectum.  Infectious versus inflammatory colitis.  It also showed pelvic ascites.  No hemoperitoneum.  IR consulted for possible paracentesis; pelvic fluid too small to drain at this time.  ID consulted.    Empiric abx with ceftriaxone and Flagyl  GI consulted   Planned    SIRS vs possible sepsis  WBC 15, transient tachycardia up to 90 upon admission   Increased to 20.6 the next day  Lactic acid within normal limits  Procalcitonin level negative   Blood culture ordered  Urinalysis with large blood, >100 RBCs, 6-10 WBCs; Negative leukocyte esterase, negative nitrate   Empiric abx    Possible SBO  Patient with history of ectopic pregnancy surgery  CT of abdomen and pelvis without contrast upon admission also showed borderline left jejunal dilatation which possibly could be ileus versus early or partial SBO.   NG tube placed at the ED.   General surgery  consulted.  Follow-up with recommendations.    Metal foreign body in left upper abdomen  CT of abdomen and pelvis with metal foreign body in the ventral left paramedian subcutaneous fat mid to upper abdomen at level L2 having sickle appearance.  X-ray of abdomen with surgical needle foreign body in the subcutaneous fat of the left paramedian ventral upper abdomen; it was not present on CT from 05/10/2009.   General surgery consulted as above    Hematemesis  GI consulted.  Discussed with Dr. Haley.  Likely Funmilayo Julius tear. No need for EGD.   Monitor    Abnormal vaginal bleeding   Gyn consulted   Monitor hemoglobin     Severe hypokalemia  Hypomagnesemia  Given repletion   Monitor    Acute on chronic normocytic anemia  Hemoglobin 7.8 upon admission.  Previous hemoglobin has been fluctuating from 7.2-10 in the past.  Next day improved to 8.7 without any blood transfusions.    Thrombocytosis  Platelet level elevated at 451 upon admission  Likely reactive   Monitor    Mild intermittent asthma  No acute issues currently.  Nebs as needed.  Monitor      Disposition plan        Fluids  Current Facility-Administered Medications   Medication Dose Route Frequency Provider Last Rate Last Admin   • lactated ringers infusion   Intravenous Continuous Tressa Gonzalez MD 75 mL/hr at 06/30/22 0843 New Bag at 06/30/22 0843        Prophylaxis  SCDs      Diet  Dietary Orders (From admission, onward)             Start     Ordered    07/01/22 0001  NPO Diet with Exceptions; Medications  DIET EFFECTIVE MIDNIGHT        Question Answer Comment   NPO Modifier with Exceptions    Exceptions Medications        06/30/22 1301    06/30/22 0617  NPO Diet with Exceptions; Medications  DIET EFFECTIVE NOW        Question Answer Comment   NPO Modifier with Exceptions    Exceptions Medications        06/30/22 0616                 Activity    Time spent:  Coordinating patient care time spent greater than 50%    Code status:   Code Status Information      Code Status    Full Resuscitation           PCP: No Pcp     I certify that this patient will require less than 2 nights of hospitalization for monitoring and evaluation and management of the above problems.  I certify that this patient meets inpatient criteria requiring hospital stay of 2 nights or more for management of the above problems.          Smoking Cessation  Ready to quit: Not Answered  Counseling given: Not Answered        Prashant Dominguez MD  6/30/2022      This note was generated in part using \"Dragon\" voice recognition technology. The report was reviewed by this physician, but still may have unintentional errors due to inherent limitations of voice recognition technology   negative